# Patient Record
Sex: MALE | Race: AMERICAN INDIAN OR ALASKA NATIVE
[De-identification: names, ages, dates, MRNs, and addresses within clinical notes are randomized per-mention and may not be internally consistent; named-entity substitution may affect disease eponyms.]

---

## 2019-06-24 ENCOUNTER — HOSPITAL ENCOUNTER (EMERGENCY)
Dept: HOSPITAL 5 - ED | Age: 29
Discharge: HOME | End: 2019-06-24
Payer: COMMERCIAL

## 2019-06-24 VITALS — SYSTOLIC BLOOD PRESSURE: 127 MMHG | DIASTOLIC BLOOD PRESSURE: 80 MMHG

## 2019-06-24 DIAGNOSIS — S29.011A: Primary | ICD-10-CM

## 2019-06-24 DIAGNOSIS — R55: ICD-10-CM
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
## 2019-06-24 DIAGNOSIS — F17.200: ICD-10-CM
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
## 2019-06-24 LAB
ALBUMIN SERPL-MCNC: 4.7 G/DL (ref 3.9–5)
ALT SERPL-CCNC: 25 UNITS/L (ref 7–56)
BASOPHILS # (AUTO): 0 K/MM3 (ref 0–0.1)
BASOPHILS NFR BLD AUTO: 0.7 % (ref 0–1.8)
BUN SERPL-MCNC: 14 MG/DL (ref 9–20)
BUN/CREAT SERPL: 14 %
CALCIUM SERPL-MCNC: 9 MG/DL (ref 8.4–10.2)
EOSINOPHIL # BLD AUTO: 0 K/MM3 (ref 0–0.4)
EOSINOPHIL NFR BLD AUTO: 0.5 % (ref 0–4.3)
HCT VFR BLD CALC: 43.9 % (ref 35.5–45.6)
HEMOLYSIS INDEX: 83
HGB BLD-MCNC: 14.3 GM/DL (ref 11.8–15.2)
LYMPHOCYTES # BLD AUTO: 2.4 K/MM3 (ref 1.2–5.4)
LYMPHOCYTES NFR BLD AUTO: 39 % (ref 13.4–35)
MCHC RBC AUTO-ENTMCNC: 33 % (ref 32–34)
MCV RBC AUTO: 88 FL (ref 84–94)
MONOCYTES # (AUTO): 0.4 K/MM3 (ref 0–0.8)
MONOCYTES % (AUTO): 6.9 % (ref 0–7.3)
PLATELET # BLD: 114 K/MM3 (ref 140–440)
RBC # BLD AUTO: 5.02 M/MM3 (ref 3.65–5.03)

## 2019-06-24 PROCEDURE — 84484 ASSAY OF TROPONIN QUANT: CPT

## 2019-06-24 PROCEDURE — 82550 ASSAY OF CK (CPK): CPT

## 2019-06-24 PROCEDURE — 93005 ELECTROCARDIOGRAM TRACING: CPT

## 2019-06-24 PROCEDURE — 36415 COLL VENOUS BLD VENIPUNCTURE: CPT

## 2019-06-24 PROCEDURE — 99284 EMERGENCY DEPT VISIT MOD MDM: CPT

## 2019-06-24 PROCEDURE — 71046 X-RAY EXAM CHEST 2 VIEWS: CPT

## 2019-06-24 PROCEDURE — 93010 ELECTROCARDIOGRAM REPORT: CPT

## 2019-06-24 PROCEDURE — 96361 HYDRATE IV INFUSION ADD-ON: CPT

## 2019-06-24 PROCEDURE — 85025 COMPLETE CBC W/AUTO DIFF WBC: CPT

## 2019-06-24 PROCEDURE — 96374 THER/PROPH/DIAG INJ IV PUSH: CPT

## 2019-06-24 PROCEDURE — 80053 COMPREHEN METABOLIC PANEL: CPT

## 2019-06-24 NOTE — EVENT NOTE
ED Screening Note


Date of service: 06/24/19


Time: 12:33


ED Screening Note: 


c/o chest pain since Saturday.  Kenny intermittent but getting worst. Left from

another hospital with INT in his left arm. 





This initial assessment/diagnostic orders/clinical plan/treatment(s) is/are 

subject to change based on patients health status, clinical progression and re-

assessment by fellow clinical providers in the ED. Further treatment and workup 

at subsequent clinical providers discretion. Patient/guardian urged not to elope

from the ED as their condition may be serious if not clinically assessed and 

managed. 





Initial orders include:

## 2019-06-24 NOTE — EMERGENCY DEPARTMENT REPORT
ED Chest Pain HPI





- General


Chief Complaint: Chest Pain


Stated Complaint: CHEST PAIN


Time Seen by Provider: 06/24/19 13:51


Source: patient


Mode of arrival: Wheelchair


Limitations: No Limitations





- History of Present Illness


Initial Comments: 





Patient is a 28-year-old American malein the past medical history is presenting 

with chest pain.  Pain is located in the left chest and is worse with movement 

of his left arm.  Patient also has pain with movement of his torso as well.  

Patient states that the pain got so bad today that he had a near syncopal epi

sode at work.  Patient is a  and does do a lot of repetitive 

motion.  He denies any obvious trauma or direct blows to the chest.  Patient 

denies any cough cold congestion fevers chills nausea vomiting at this time.  

Symptoms have been present constantly for the past


Severity scale (0 -10): 6


Quality: tightness, heaviness, squeezing


Consistency: constant


Worsens With: movement


re: denies: nausea, vomting, diaphoresis, dyspnea, sense of impending doom


Other Symptoms: denies: cough, fever, acid taste in mouth





- Related Data


                                  Previous Rx's











 Medication  Instructions  Recorded  Last Taken  Type


 


Ketorolac [Toradol] 10 mg PO Q6H PRN #12 tablet 06/24/19 Unknown Rx


 


methOCARBAMOL [Robaxin TAB] 500 mg PO Q6H PRN #14 tablet 06/24/19 Unknown Rx


 


traMADol [Ultram] 50 mg PO Q6HR PRN #12 tablet 06/24/19 Unknown Rx











                                    Allergies











Allergy/AdvReac Type Severity Reaction Status Date / Time


 


No Known Allergies Allergy   Unverified 06/24/19 12:08














Heart Score





- HEART Score


History: Slightly suspicious


EKG: Normal


Age: < 45


Risk factors: No known risk factors


Troponin: < normal limit


HEART Score: 0





ED Review of Systems


ROS: 


Stated complaint: CHEST PAIN


Other details as noted in HPI





Comment: All other systems reviewed and negative





ED Past Medical Hx





- Past Medical History


Previous Medical History?: No





- Surgical History


Past Surgical History?: No





- Social History


Smoking Status: Current Every Day Smoker


Substance Use Type: None





- Medications


Home Medications: 


                                Home Medications











 Medication  Instructions  Recorded  Confirmed  Last Taken  Type


 


Ketorolac [Toradol] 10 mg PO Q6H PRN #12 tablet 06/24/19  Unknown Rx


 


methOCARBAMOL [Robaxin TAB] 500 mg PO Q6H PRN #14 tablet 06/24/19  Unknown Rx


 


traMADol [Ultram] 50 mg PO Q6HR PRN #12 tablet 06/24/19  Unknown Rx














ED Physical Exam





- General


Limitations: No Limitations


General appearance: alert, in no apparent distress





- Head


Head exam: Present: atraumatic, normocephalic





- Eye


Eye exam: Present: normal appearance





- ENT


ENT exam: Present: mucous membranes moist





- Neck


Neck exam: Present: normal inspection





- Respiratory


Respiratory exam: Present: normal lung sounds bilaterally, chest wall tenderness

 (left chest, very reproducable).  Absent: respiratory distress, wheezes, rales,

 rhonchi





- Cardiovascular


Cardiovascular Exam: Present: regular rate, normal rhythm.  Absent: systolic 

murmur, diastolic murmur, rubs, gallop





- GI/Abdominal


GI/Abdominal exam: Present: soft, normal bowel sounds.  Absent: distended, 

tenderness, guarding, rebound





- Rectal


Rectal exam: Present: deferred





- Extremities Exam


Extremities exam: Present: normal inspection





- Back Exam


Back exam: Present: normal inspection





- Neurological Exam


Neurological exam: Present: alert, oriented X3





- Psychiatric


Psychiatric exam: Present: normal affect, normal mood





- Skin


Skin exam: Present: warm, dry, intact, normal color.  Absent: rash





ED Course





                                   Vital Signs











  06/24/19 06/24/19





  12:24 14:47


 


Temperature 98.3 F 


 


Pulse Rate 80 


 


Respiratory 16 18





Rate  


 


Blood Pressure 127/80 


 


O2 Sat by Pulse 100 





Oximetry  














ED Medical Decision Making





- Lab Data


Result diagrams: 


                                 06/24/19 13:13





                                 06/24/19 13:13








                                   Lab Results











  06/24/19 06/24/19 06/24/19 Range/Units





  13:13 13:13 13:13 


 


WBC  6.1    (4.5-11.0)  K/mm3


 


RBC  5.02    (3.65-5.03)  M/mm3


 


Hgb  14.3    (11.8-15.2)  gm/dl


 


Hct  43.9    (35.5-45.6)  %


 


MCV  88    (84-94)  fl


 


MCH  29    (28-32)  pg


 


MCHC  33    (32-34)  %


 


RDW  13.9    (13.2-15.2)  %


 


Plt Count  114 L    (140-440)  K/mm3


 


Lymph % (Auto)  39.0 H    (13.4-35.0)  %


 


Mono % (Auto)  6.9    (0.0-7.3)  %


 


Eos % (Auto)  0.5    (0.0-4.3)  %


 


Baso % (Auto)  0.7    (0.0-1.8)  %


 


Lymph #  2.4    (1.2-5.4)  K/mm3


 


Mono #  0.4    (0.0-0.8)  K/mm3


 


Eos #  0.0    (0.0-0.4)  K/mm3


 


Baso #  0.0    (0.0-0.1)  K/mm3


 


Seg Neutrophils %  52.9    (40.0-70.0)  %


 


Seg Neutrophils #  3.2    (1.8-7.7)  K/mm3


 


Sodium   137   (137-145)  mmol/L


 


Potassium   4.5   (3.6-5.0)  mmol/L


 


Chloride   101.7   ()  mmol/L


 


Carbon Dioxide   22   (22-30)  mmol/L


 


Anion Gap   18   mmol/L


 


BUN   14   (9-20)  mg/dL


 


Creatinine   1.0   (0.8-1.5)  mg/dL


 


Estimated GFR   > 60   ml/min


 


BUN/Creatinine Ratio   14   %


 


Glucose   88   ()  mg/dL


 


Calcium   9.0   (8.4-10.2)  mg/dL


 


Total Bilirubin   0.80   (0.1-1.2)  mg/dL


 


AST   29   (5-40)  units/L


 


ALT   25   (7-56)  units/L


 


Alkaline Phosphatase   42   ()  units/L


 


Total Creatine Kinase    327 H  ()  units/L


 


Troponin T   < 0.010   (0.00-0.029)  ng/mL


 


Total Protein   8.1   (6.3-8.2)  g/dL


 


Albumin   4.7   (3.9-5)  g/dL


 


Albumin/Globulin Ratio   1.4   %














- EKG Data


-: EKG Interpreted by Me


EKG shows normal: sinus rhythm, axis, intervals, QRS complexes, ST-T waves


Rate: normal





- EKG Data


Interpretation: normal EKG





- Radiology Data





chest XR within normal limits








- Medical Decision Making


Patient is a 28-year-old  male with no significant past history.

  Patient is complaining of left-sided chest pain which despite being 

reproducible also led to him having a near syncopal episode earlier today.  See 

EKG is within normal limits as was chest x-ray.  Patient does work in 

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 

rhabdomyolysis.  He did have a slight bump in his CK however this was under 

thousand and did not indicate the need for admission.  Patient was hydrated give

n meds for symptomatic relief and will be discharged home.


Critical care attestation.: 


If time is entered above; I have spent that time in minutes in the direct care 

of this critically ill patient, excluding procedure time.








ED Disposition


Clinical Impression: 


 Acute chest pain, Near syncope





Chest wall muscle strain


Qualifiers:


 Encounter type: initial encounter Qualified Code(s): S29.011A - Strain of 

muscle and tendon of front wall of thorax, initial encounter





Disposition: DC-01 TO HOME OR SELFCARE


Is pt being admited?: No


Does the pt Need Aspirin: No


Condition: Stable


Instructions:  Chest Pain (ED), Muscle Strain (ED)


Referrals: 


[facility], [place] [Primary Care Provider] - 3-5 Days


Time of Disposition: 16:06